# Patient Record
Sex: MALE | Race: OTHER | NOT HISPANIC OR LATINO | URBAN - METROPOLITAN AREA
[De-identification: names, ages, dates, MRNs, and addresses within clinical notes are randomized per-mention and may not be internally consistent; named-entity substitution may affect disease eponyms.]

---

## 2023-05-21 ENCOUNTER — EMERGENCY (EMERGENCY)
Facility: HOSPITAL | Age: 58
LOS: 1 days | Discharge: HOME | End: 2023-05-21
Admitting: EMERGENCY MEDICINE
Payer: MEDICAID

## 2023-05-21 VITALS
RESPIRATION RATE: 18 BRPM | HEART RATE: 82 BPM | OXYGEN SATURATION: 98 % | TEMPERATURE: 98 F | SYSTOLIC BLOOD PRESSURE: 115 MMHG | WEIGHT: 199.96 LBS | DIASTOLIC BLOOD PRESSURE: 81 MMHG | HEIGHT: 68 IN

## 2023-05-21 DIAGNOSIS — M79.662 PAIN IN LEFT LOWER LEG: ICD-10-CM
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
## 2023-05-21 PROCEDURE — 99283 EMERGENCY DEPT VISIT LOW MDM: CPT

## 2023-05-21 RX ORDER — IBUPROFEN 200 MG
800 TABLET ORAL ONCE
Refills: 0 | Status: COMPLETED | OUTPATIENT
Start: 2023-05-21 | End: 2023-05-21

## 2023-05-21 RX ADMIN — Medication 800 MILLIGRAM(S): at 22:54

## 2023-05-21 NOTE — ED ADULT NURSE NOTE - NSFALLUNIVINTERV_ED_ALL_ED
Bed/Stretcher in lowest position, wheels locked, appropriate side rails in place/Call bell, personal items and telephone in reach/Instruct patient to call for assistance before getting out of bed/chair/stretcher/Non-slip footwear applied when patient is off stretcher/[place] to call system/Physically safe environment - no spills, clutter or unnecessary equipment/Purposeful proactive rounding/Room/bathroom lighting operational, light cord in reach

## 2023-05-21 NOTE — ED ADULT NURSE NOTE - HIV OFFER
Opt out You can access the FollowMyHealth Patient Portal offered by [facility] by registering at the following website: [email or web address]. By joining [employer]’s FollowMyHealth portal, you will also be able to view your health information using other applications (apps) compatible with our system.

## 2023-05-21 NOTE — ED PROVIDER NOTE - CLINICAL SUMMARY MEDICAL DECISION MAKING FREE TEXT BOX
left lower leg pain after falling asleep on leg while sleeping on street today, unremarkable physical exam, nvi, no indication for imaging still still offered xrays, patient declined and would like ibuprofen which was ordered. advised supportive care, otc pain meds, leg elevation, f/u pmd.

## 2023-05-21 NOTE — ED PROVIDER NOTE - NSFOLLOWUPINSTRUCTIONS_ED_ALL_ED_FT
Take Ibuprofen 600mg every 6-8 hours as needed for pain, take with food, and in addition you may take Tylenol 500 mg every 6-8 hours as needed for pain  Rest. Cool compresses.  Extremity elevation.  Ace wrap     Return for any concerning or worsening symptoms such as worsening pain, swelling, fever, redness, or any concerns.    Follow up with your primary care doctor or clinics listed below if you [place] [address]  To make an appointment, call [phone]  [facility]  Address: [address] [place] [address]  Appointment Center: [phone] ([phone])
No

## 2023-05-21 NOTE — ED PROVIDER NOTE - PHYSICAL EXAMINATION
CONSTITUTIONAL: Well-appearing; well-nourished; in no apparent distress.   	LLE: normal appearance, no break in skin, nontender, good ROM joints, dpi, soft compartments, good ROM joints, no calf tenderness or swelling. normal appearance. ambulatory.   	NEURO: A & O x 3; face symmetric; grossly unremarkable.   PSYCHOLOGICAL: The patient’s mood and manner are appropriate.

## 2023-05-21 NOTE — ED ADULT TRIAGE NOTE - CHIEF COMPLAINT QUOTE
Pt walked into ER c/o left lower leg pain that started today after walking all day. Pt denies trauma/injury to same. No obvious injuries observed, no further complaints at triage.

## 2023-05-21 NOTE — ED PROVIDER NOTE - PATIENT PORTAL LINK FT
You can access the FollowMyHealth Patient Portal offered by [facility] by registering at the following website: [email or web address]. By joining [employer]’s FollowMyHealth portal, you will also be able to view your health information using other applications (apps) compatible with our system.

## 2023-05-21 NOTE — ED PROVIDER NOTE - OBJECTIVE STATEMENT
59 yo transgender, prefers to be called "carolina", presents c/o left lower leg pain after falling asleep on it last night while sleeping on the street, mostly to left ankle. denies fall. no numbness or weakness. ambulatory.

## 2023-07-30 ENCOUNTER — EMERGENCY (EMERGENCY)
Facility: HOSPITAL | Age: 58
LOS: 1 days | Discharge: HOME | End: 2023-07-30
Admitting: EMERGENCY MEDICINE
Payer: MEDICAID

## 2023-07-30 VITALS
WEIGHT: 218.92 LBS | RESPIRATION RATE: 17 BRPM | HEART RATE: 81 BPM | DIASTOLIC BLOOD PRESSURE: 96 MMHG | OXYGEN SATURATION: 96 % | HEIGHT: 68 IN | SYSTOLIC BLOOD PRESSURE: 147 MMHG | TEMPERATURE: 98 F

## 2023-07-30 PROCEDURE — 99283 EMERGENCY DEPT VISIT LOW MDM: CPT

## 2023-07-30 RX ADMIN — Medication 300 MILLIGRAM(S): at 18:29

## 2023-07-30 NOTE — ED ADULT NURSE NOTE - NSFALLUNIVINTERV_ED_ALL_ED
Bed/Stretcher in lowest position, wheels locked, appropriate side rails in place/Call bell, personal items and telephone in reach/Instruct patient to call for assistance before getting out of bed/chair/stretcher/Non-slip footwear applied when patient is off stretcher/[place] to call system/Physically safe environment - no spills, clutter or unnecessary equipment/Purposeful proactive rounding/Room/bathroom lighting operational, light cord in reach

## 2023-07-30 NOTE — ED PROVIDER NOTE - PHYSICAL EXAMINATION
General: well developed, well nourished, no distress  Eye: bilateral: PERRL, EOMI  Ears, Nose, Throat: normal pharynx, TMs normal, membranes moist.  Neck: non-tender, full range of motion, supple.  Negative For: lymphadenopathy (R), lymphadenopathy (L)  Respiratory: CTAB.  Cardiovascular: S1-S2 normal, regular rate, regular rhythm.  Abdomen: normal bowel sounds, non tender, soft.    Genitourinary: Negative For: CVA tenderness  Musculoskeletal: normal gait.  Negative For: back pain, upper, back pain  Extremities: normal range of motion, non-tender.  Negative For: edema (R), edema (L), calf tenderness (R), calf tenderness (L), swelling  Extremity Strength: upper extremities equal bilateral: 5/5, lower extremities equal bilateral: 5/5  Neurologic: alert, oriented to person, oriented to place, oriented to time.    Skin: normal color.  Negative For: rash  1 cm x 1 cm circular, erythematous indurated area appreciated on the inside of the left thigh.  No fluctuance noted.  No discharge, appreciated.  Mild tenderness to palpation appreciated.  Psychiatric: normal affect, normal insight, normal concentration

## 2023-07-30 NOTE — ED PROVIDER NOTE - OBJECTIVE STATEMENT
58-year-old trans female, medical history of diabetes, presents this emergency department for pain and erythema on the inside of the left thigh that patient noticed today.  Patient states that her thighs chief when she walks, and sometimes an infection happens.  Has not tried any medications for symptomatic relief.  Does not shave the area often.  Denies any discharge or fevers, chills.

## 2023-07-30 NOTE — ED PROVIDER NOTE - CLINICAL SUMMARY MEDICAL DECISION MAKING FREE TEXT BOX
58-year-old trans female presents this emergency department for cellulitic changes in the inside of the left thigh  Vital signs are stable and physical exam is shows induration, however no obvious abscess appreciated.  Patient will be treated on an outpatient basis, given clindamycin instructed follow-up primary care doctor  No results with patient.  Patient recently we will plan.  Agreed to follow-up with primary care doctor in 2 to 3 days.

## 2023-07-30 NOTE — ED PROVIDER NOTE - PATIENT PORTAL LINK FT
You can access the FollowMyHealth Patient Portal offered by [facility] by registering at the following website: [email or web address]. By joining [employer]’s FollowMyHealth portal, you will also be able to view your health information using other applications (apps) compatible with our system.

## 2023-08-01 DIAGNOSIS — L03.116 CELLULITIS OF LEFT LOWER LIMB: ICD-10-CM

## 2023-08-01 DIAGNOSIS — E11.9 TYPE 2 DIABETES MELLITUS WITHOUT COMPLICATIONS: ICD-10-CM

## 2023-08-01 DIAGNOSIS — L02.416 CUTANEOUS ABSCESS OF LEFT LOWER LIMB: ICD-10-CM

## 2023-08-02 ENCOUNTER — EMERGENCY (EMERGENCY)
Facility: HOSPITAL | Age: 58
LOS: 1 days | Discharge: HOME | End: 2023-08-02
Admitting: EMERGENCY MEDICINE
Payer: MEDICAID

## 2023-08-02 VITALS
TEMPERATURE: 98 F | OXYGEN SATURATION: 95 % | RESPIRATION RATE: 19 BRPM | HEIGHT: 68 IN | HEART RATE: 87 BPM | WEIGHT: 179.9 LBS | DIASTOLIC BLOOD PRESSURE: 89 MMHG | SYSTOLIC BLOOD PRESSURE: 159 MMHG

## 2023-08-02 PROCEDURE — 99283 EMERGENCY DEPT VISIT LOW MDM: CPT

## 2023-08-02 RX ADMIN — Medication 300 MILLIGRAM(S): at 22:02

## 2023-08-02 NOTE — ED PROVIDER NOTE - OBJECTIVE STATEMENT
58-year-old trans female presents to this emergency department for a wound check.  Patient was seen here last week by myself, for folliculitis.  States that the area has "errupted", and skin overlying it came off.  States that she still shaves when she walks.  Has not been able to  her antibiotics as she did not have any money.  However she has money now, and will pick it up.  Denies any fevers, myalgias.  Has not seen dermatologist for this.

## 2023-08-02 NOTE — ED PROVIDER NOTE - CLINICAL SUMMARY MEDICAL DECISION MAKING FREE TEXT BOX
Patient presents for pain, erythema over indurated area on the inside of the left thigh  Patient was seen here last week, diagnosed with folliculitis, and instructed to  antibiotics, but patient could not pay for them.  Patient states that she wants 1 dose here and will pick them up at home tomorrow morning.  No drainage, fevers, myalgias appreciated  Patient stable for discharge  Patient understands and agrees with plan.  Agree to follow-up with primary care doctor in 2 to 3 days.

## 2023-08-02 NOTE — ED PROVIDER NOTE - PATIENT PORTAL LINK FT
You can access the FollowMyHealth Patient Portal offered by [facility] by registering at the following website: [email or web address]. By joining [employer]’s FollowMyHealth portal, you will also be able to view your health information using other applications (apps) compatible with our system.

## 2023-08-02 NOTE — ED ADULT TRIAGE NOTE - CHIEF COMPLAINT QUOTE
Pt presents to ed reporting wound on upper left leg, was seen in ed last week and prescribed abx, but reports she was unable to  abx rx. denies fever

## 2023-08-02 NOTE — ED ADULT NURSE NOTE - OBJECTIVE STATEMENT
Patient is a 59 y/o M c/o wound check. patient reports wound for the past week. Patient reports they were not able to  meds.

## 2023-08-02 NOTE — ED ADULT NURSE NOTE - NSFALLUNIVINTERV_ED_ALL_ED
Bed/Stretcher in lowest position, wheels locked, appropriate side rails in place/Call bell, personal items and telephone in reach/Instruct patient to call for assistance before getting out of bed/chair/stretcher/Non-slip footwear applied when patient is off stretcher/[place] to call system/Physically safe environment - no spills, clutter or unnecessary equipment/Purposeful proactive rounding/Room/bathroom lighting operational, light cord in reach

## 2023-08-02 NOTE — ED PROVIDER NOTE - PHYSICAL EXAMINATION
General: well developed, well nourished, no distress  Eye: bilateral: PERRL, EOMI  Ears, Nose, Throat: normal pharynx, TMs normal, membranes moist.  Neck: non-tender, full range of motion, supple.  Negative For: lymphadenopathy (R), lymphadenopathy (L)  Respiratory: CTAB.  Cardiovascular: S1-S2 normal, regular rate, regular rhythm.  Abdomen: normal bowel sounds, non tender, soft.    Genitourinary: Negative For: CVA tenderness  Musculoskeletal: normal gait.  Negative For: back pain, upper, back pain  Extremities: normal range of motion, non-tender.  Negative For: edema (R), edema (L), calf tenderness (R), calf tenderness (L), swelling  Extremity Strength: upper extremities equal bilateral: 5/5, lower extremities equal bilateral: 5/5  Neurologic: alert, oriented to person, oriented to place, oriented to time.    Skin: 1 cm x 1 cm erythematous area with obvious chafing over area.  No fluctuance noted.  Induration appreciated.  No purulent discharge, tenderness to palpation appreciated  Psychiatric: normal affect, normal insight, normal concentration

## 2023-08-04 DIAGNOSIS — L73.9 FOLLICULAR DISORDER, UNSPECIFIED: ICD-10-CM

## 2023-08-13 ENCOUNTER — EMERGENCY (EMERGENCY)
Facility: HOSPITAL | Age: 58
LOS: 1 days | Discharge: HOME | End: 2023-08-13
Admitting: EMERGENCY MEDICINE
Payer: MEDICAID

## 2023-08-13 VITALS
RESPIRATION RATE: 16 BRPM | WEIGHT: 199.96 LBS | DIASTOLIC BLOOD PRESSURE: 89 MMHG | HEART RATE: 90 BPM | OXYGEN SATURATION: 97 % | TEMPERATURE: 98 F | HEIGHT: 68 IN | SYSTOLIC BLOOD PRESSURE: 151 MMHG

## 2023-08-13 DIAGNOSIS — M79.671 PAIN IN RIGHT FOOT: ICD-10-CM

## 2023-08-13 DIAGNOSIS — M79.672 PAIN IN LEFT FOOT: ICD-10-CM

## 2023-08-13 PROCEDURE — 99283 EMERGENCY DEPT VISIT LOW MDM: CPT

## 2023-08-13 RX ORDER — ACETAMINOPHEN 500 MG
650 TABLET ORAL ONCE
Refills: 0 | Status: COMPLETED | OUTPATIENT
Start: 2023-08-13 | End: 2023-08-13

## 2023-08-13 RX ADMIN — Medication 650 MILLIGRAM(S): at 04:06

## 2023-08-13 NOTE — ED PROVIDER NOTE - NS ED ROS FT
+feet pain  Denies fevers, chills, nausea, vomiting, diarrhea, constipation, abdominal pain, urinary symptoms, chest pain, palpitations, shortness of breath, dyspnea on exertion, syncope/near syncope, cough/URI symptoms, headache, weakness, numbness, focal deficits, visual changes, gait or balance changes, dizziness

## 2023-08-13 NOTE — ED PROVIDER NOTE - PATIENT PORTAL LINK FT
You can access the FollowMyHealth Patient Portal offered by [facility] by registering at the following website: [email or web address]. By joining [employer]’s FollowMyHealth portal, you will also be able to view your health information using other applications (apps) compatible with our system.

## 2023-08-13 NOTE — ED PROVIDER NOTE - CLINICAL SUMMARY MEDICAL DECISION MAKING FREE TEXT BOX
58-year-old male, denies significant history, presenting to the ED complaining of bilateral foot pain along the distal plantar ends. No red flags on exam; no evidence of swelling, erythema, or infection.  Some mild tenderness to palpation noted along the balls of the feet bilaterally.  No acute trauma as per patient history; no indication for acute imaging at this time.  Will give Tylenol for pain relief.  Will also give patient something to eat as he is undomiciled.  He is instructed to follow-up with the [place] clinic for further primary care management.  Patient stable on discharge.

## 2023-08-13 NOTE — ED PROVIDER NOTE - PHYSICAL EXAMINATION
VITAL SIGNS: I have reviewed nursing notes and confirm.  CONSTITUTIONAL: Well-developed; well-nourished; in no acute distress.  SKIN: No acute rash.  HEAD: Normocephalic; atraumatic.  CARD: No extremity cyanosis.  RESP: Speaks in full, clear sentences.  EXT: +mild ttp along the "ball" regions of the berenice feet; No swelling or erythema. FROM of the feet. Moves all extremities normally.  NEURO: Alert, oriented. Grossly unremarkable. No focal deficits. Fluent speech.   PSYCH: Cooperative, appropriate. Mood and affect wnl.

## 2023-08-13 NOTE — ED ADULT NURSE NOTE - NSFALLUNIVINTERV_ED_ALL_ED
Bed/Stretcher in lowest position, wheels locked, appropriate side rails in place/Call bell, personal items and telephone in reach/Instruct patient to call for assistance before getting out of bed/chair/stretcher/Non-slip footwear applied when patient is off stretcher/[place] to call system/Physically safe environment - no spills, clutter or unnecessary equipment/Purposeful proactive rounding/Room/bathroom lighting operational, light cord in reach

## 2023-08-13 NOTE — ED PROVIDER NOTE - OBJECTIVE STATEMENT
58-year-old male, denies significant history, presenting to the ED complaining of bilateral foot pain along the distal plantar ends.  Patient states he recently received new shoes yesterday after walking around barefoot previously.  Patient denies any direct trauma or injury to the area.  He is requesting Tylenol and something to eat for symptom relief.

## 2023-08-13 NOTE — ED ADULT TRIAGE NOTE - CHIEF COMPLAINT QUOTE
Pt. walked in c/o SOB due to her sleep apnea and c/o b/l feet pain. SPO2 98% on arrival, speaking in full sentences and falling asleep in chair.

## 2023-08-31 ENCOUNTER — EMERGENCY (EMERGENCY)
Facility: HOSPITAL | Age: 58
LOS: 1 days | Discharge: HOME | End: 2023-08-31
Admitting: EMERGENCY MEDICINE
Payer: MEDICAID

## 2023-08-31 VITALS
HEIGHT: 68 IN | TEMPERATURE: 98 F | OXYGEN SATURATION: 98 % | DIASTOLIC BLOOD PRESSURE: 77 MMHG | HEART RATE: 110 BPM | RESPIRATION RATE: 16 BRPM | SYSTOLIC BLOOD PRESSURE: 143 MMHG

## 2023-08-31 DIAGNOSIS — F64.0 TRANSSEXUALISM: ICD-10-CM

## 2023-08-31 DIAGNOSIS — B35.3 TINEA PEDIS: ICD-10-CM

## 2023-08-31 DIAGNOSIS — M79.671 PAIN IN RIGHT FOOT: ICD-10-CM

## 2023-08-31 DIAGNOSIS — E11.9 TYPE 2 DIABETES MELLITUS WITHOUT COMPLICATIONS: ICD-10-CM

## 2023-08-31 DIAGNOSIS — Z59.00 HOMELESSNESS UNSPECIFIED: ICD-10-CM

## 2023-08-31 PROCEDURE — 99283 EMERGENCY DEPT VISIT LOW MDM: CPT

## 2023-08-31 RX ORDER — CICLOPIROX OLAMINE 7.7 MG/G
1 CREAM TOPICAL
Qty: 1 | Refills: 0
Start: 2023-08-31 | End: 2023-09-13

## 2023-08-31 RX ORDER — ACETAMINOPHEN 500 MG
975 TABLET ORAL ONCE
Refills: 0 | Status: COMPLETED | OUTPATIENT
Start: 2023-08-31 | End: 2023-08-31

## 2023-08-31 RX ADMIN — Medication 975 MILLIGRAM(S): at 02:32

## 2023-08-31 RX ADMIN — Medication 500 MILLIGRAM(S): at 02:32

## 2023-08-31 SDOH — ECONOMIC STABILITY - HOUSING INSECURITY: HOMELESSNESS UNSPECIFIED: Z59.00

## 2023-08-31 NOTE — ED PROVIDER NOTE - OBJECTIVE STATEMENT
57 yo transgender M to F with PMHx of NIDDM, undomciled, presenting c/o b/l feet pain and itching x 2 wks. Pain is worse tonight after prolonged walking. Denies fever, chills, trauma, fall, sores, blisters, d/c, rash, CP, SOB, palpitations, N/V, HA, dizziness, focal weakness, change in urinary/bowel function, calf pain, swelling, LOC, and malaise.

## 2023-08-31 NOTE — ED PROVIDER NOTE - PHYSICAL EXAMINATION
Gen - WDWN transgender F, NAD, speaking in full sentences  Skin - no acute rash, intact, mild plaque like rash to the interdigital spaces of b/l feet with mild erythema and excoriation, no streaking, crepitus, fissures, d/c, warmth, fluctuance or desquamation of the skin   HEENT - AT/NC, no conjunctival injection or dc, neck supple and FROM, airway patent   CV - S1S2, R/R/R  Resp - CTAB, no focal r/r/w   MSK - w/w/p, full ROM of peripheral joints, mild TTP to b/l soles, other FROM, NV Intact, +SILT, compartment soft, calves supple and NT, symmetric distal pulses b/l, no midline tenderness   Psych - euphoria, normal speech and eye contact, judgement/insight intact   Neuro - AxOx3, ambulatory with steady gait

## 2023-08-31 NOTE — ED PROVIDER NOTE - NSFOLLOWUPCLINICS_GEN_ALL_ED_FT
[facility] - Podiatry Clinic  Podiatry  [address] [place] [address]  Phone: [phone]  Fax:

## 2023-08-31 NOTE — ED PROVIDER NOTE - CLINICAL SUMMARY MEDICAL DECISION MAKING FREE TEXT BOX
medical screening exam has been performed.  Pt with no acute trauma or wound on exam, skin intact except for mild rash suggestive of tinea pedis. NV intact, ambulatory with steady gait, pain adequately controlled, given course of topical antifungal, instructed prompt f/u with podiatry. medically stable for outpt clinic f/u

## 2023-08-31 NOTE — ED ADULT NURSE NOTE - NSFALLUNIVINTERV_ED_ALL_ED
Bed/Stretcher in lowest position, wheels locked, appropriate side rails in place/Call bell, personal items and telephone in reach/Instruct patient to call for assistance before getting out of bed/chair/stretcher/Non-slip footwear applied when patient is off stretcher/[place] to call system/Physically safe environment - no spills, clutter or unnecessary equipment/Purposeful proactive rounding/Room/bathroom lighting operational, light cord in reach

## 2023-08-31 NOTE — ED PROVIDER NOTE - NSFOLLOWUPINSTRUCTIONS_ED_ALL_ED_FT
Chronic Pain    Chronic pain is a complex condition and the Emergency Department is not the ideal place to manage this condition. Prescription painkillers must be written by your primary care provider or a pain management physician. Avoid activities or triggers that exacerbate your pain.    SEEK IMMEDIATE MEDICAL CARE IF YOU HAVE ANY OF THE FOLLOWING SYMPTOMS: severe chest pain, fainting, vomiting blood, dark or bloody stools, or pain different from your chronic pain.     Athlete's Foot    WHAT YOU NEED TO KNOW:    What is athlete's foot? Athlete's foot is a foot infection caused by a fungus.    What increases my risk for athlete's foot? Athlete's foot is spread when an infected person shares towels or walks barefoot in shower stalls or public locker rooms. Your risk is greater if you do not wash your feet or do not change your socks every day.    What are the signs and symptoms of athlete's foot?    Cracks or blisters    Redness, swelling, itching, or burning    Scaly or peeling skin    Bad smelling feet    Thick, dark skin on the bottoms or sides of your feet    Thick, abnormal toenails  How is athlete's foot diagnosed? Your healthcare provider may be able to tell you have athlete's foot by looking at your feet. Your provider may look at a skin sample through a microscope. This will help the provider know the type of fungus that is causing your infection.    How is athlete's foot treated? Athlete's foot is usually treated with an antifungal medicine. This medicine may be given as a cream or pill. You may need a doctor's order for this medicine. Take the medicine until it is gone, even if your feet look like they are healed. You may also need to soak your feet in an astringent (drying) solution if you have blisters. Ask your healthcare provider for more information on how to treat blisters.    How can I prevent athlete's foot?    Prevent the spread of the fungus that causes athlete's foot. After you bathe, dry other parts of your body before you dry your feet. Put your socks on before you put other clothing on. Do not share socks, shoes, towels, or clothing with others.    Keep your feet clean and dry. Wash your feet each day and dry them well, especially between your toes. Then put powder on your feet and between your toes. Wear clean cotton or wool socks each day. Wear sandals, canvas tennis shoes, or other shoes that allow air to flow to your feet. Do not use shoes that are tight, or made of plastic or rubber. Wear shower shoes or sandals in public and in warm, damp areas. Examples include shower stalls, near swimming pools, and in locker rooms.    Wash your hands after you touch your feet. Use soap and water. Do not use hand  on your hands or feet instead of soap and water. Even hand  that contains alcohol will not kill the fungus that causes athlete's foot.  Handwashing  When should I seek immediate care?    You have a fever or chills.    You have red streaks going up your leg.  When should I call my doctor?    Your infection spreads to other parts of your body.    Your infection is not better within 14 days or is not completely gone within 90 days.    The skin on your foot or leg is red and hot.    You have questions or concerns about your condition or care.    [address], [address]rd [place] [address]    Please call the below preferred service for your appointment     -Cardiology [phone]  -Colon & Rectal Surgery [phone]  -Orthopedics [phone]  -Otolaryngology (ENT) [phone]  -Pulmonary [phone]; [phone]  -Rheumatology [phone]  -Vascular Surgery [phone]     [address] [address]    Please call the below preferred service for your appointment     -Dermatology [phone]  -Endocrinology [phone]  -Gastroenterology [phone]  -Internal Medicine/Primary Care [phone]  -Neurology [phone]     [address], [facility] [place] [address]    Please call the below preferred service for your appointment     -Infectious Disease [phone]  -Internal Medicine/Primary Care [phone]  -Nephrology [phone]  -Vascular Surgery [phone]

## 2023-09-01 NOTE — ED PROVIDER NOTE - NSFOLLOWUPINSTRUCTIONS_ED_ALL_ED_FT
Follow up with your primary care doctor or clinics listed below if you do not have a doctor,    [facility] [place] [facility] [address]  [address] · [phone]  Call to make an appointment.  **Clinic for uninsured patients**     OTHER OPTIONS:  [facility]  [address] [place] [address]  To make an appointment, call [phone]    [facility]  Address: [address] [place] [address]  Appointment Center: [phone] ([phone])     [facility] is a good referral line for crisis and substance abuse help.  [facility] has drop in programs all over the [facility].    Return to the ER for Emergencies.  Return immediately for any new or worsening symptoms or any new concerns     Medical Screening Exam    A medical screening exam (MSE) helps to determine whether you need immediate medical treatment relating to any number of symptoms you are having. This type of exam may be done in an emergency department, an urgent care setting, or your health care provider's office.    Depending on your symptoms and severity, you may need additional tests or medical therapy.    It is important to note that an MSE does not necessarily mean that you will need or receive further medical testing or interventions if your symptoms are not deemed to be medically urgent (emergent).    Tell a health care provider about:  Any allergies you have.  All medicines you are taking, including vitamins, herbs, eye drops, creams, and over-the-counter medicines.  Any problems you or family members have had with anesthetic medicines.  Any bleeding problems you have.  Any surgeries you have had.  Any medical conditions you have.  Whether you are pregnant or may be pregnant.  What happens during the test?  A health care provider touching a person's throat during a medical exam.  During the exam, a health care provider does a short, often focused, physical exam and asks about your medical history to assess:  Your current symptoms.  Your overall health.  Your need for possible further medical intervention.  What can I expect after the test?  If you have a regular health care provider, make an appointment for a follow-up visit with him or her. If you do not have a regular health care provider, ask about resources in your community.    Your medical screening exam may determine that:  You do not need emergency treatment at this time.  You need treatment right away.  You need to be transferred to another medical center. This may happen if you need an emergent specialist or consultant that is not available at the medical center you are at.  You need to have more tests.  A medical specialist may be consulted if needed.    Get help right away if:  Your condition gets worse.  You develop new or troubling symptoms before you see your health care provider.  These symptoms may represent a serious problem that is an emergency. Do not wait to see if the symptoms will go away. Get medical help right away. Call your local emergency services (911 in the [place] Do not drive yourself to the hospital.    Summary  A medical screening exam helps to determine whether you need medical treatment right away. This type of exam may be done in an emergency department, an urgent care setting, or your health care provider's office.  During the exam, a health care provider does a short physical exam and asks about your current symptoms and overall health.  Depending on the exam, more tests or therapies may be ordered. However, an MSE does not necessarily mean that you will have further medical testing if your symptoms are not deemed to be urgent.  If you need further care that is not offered at your current medical center, you may need to be transferred to another facility.  This information is not intended to replace advice given to you by your health care provider. Make sure you discuss any questions you have with your health care provider. No

## 2024-07-17 ENCOUNTER — EMERGENCY (EMERGENCY)
Facility: HOSPITAL | Age: 59
LOS: 1 days | Discharge: HOME | End: 2024-07-17
Attending: EMERGENCY MEDICINE | Admitting: EMERGENCY MEDICINE
Payer: MEDICAID

## 2024-07-17 VITALS
TEMPERATURE: 98 F | DIASTOLIC BLOOD PRESSURE: 77 MMHG | HEART RATE: 101 BPM | OXYGEN SATURATION: 94 % | RESPIRATION RATE: 18 BRPM | SYSTOLIC BLOOD PRESSURE: 107 MMHG

## 2024-07-17 PROBLEM — E11.9 TYPE 2 DIABETES MELLITUS WITHOUT COMPLICATIONS: Chronic | Status: ACTIVE | Noted: 2023-08-31

## 2024-07-17 PROCEDURE — 99283 EMERGENCY DEPT VISIT LOW MDM: CPT
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
## 2024-07-19 DIAGNOSIS — M79.672 PAIN IN LEFT FOOT: ICD-10-CM
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
## 2024-07-19 DIAGNOSIS — M79.671 PAIN IN RIGHT FOOT: ICD-10-CM
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
## 2024-07-19 DIAGNOSIS — Z59.00 HOMELESSNESS UNSPECIFIED: ICD-10-CM

## 2024-07-19 SDOH — ECONOMIC STABILITY - HOUSING INSECURITY: HOMELESSNESS UNSPECIFIED: Z59.00
